# Patient Record
Sex: FEMALE | Race: WHITE
[De-identification: names, ages, dates, MRNs, and addresses within clinical notes are randomized per-mention and may not be internally consistent; named-entity substitution may affect disease eponyms.]

---

## 2018-07-21 ENCOUNTER — HOSPITAL ENCOUNTER (EMERGENCY)
Dept: HOSPITAL 92 - ERS | Age: 53
Discharge: ANOTHER HOSPITAL | End: 2018-07-21
Payer: COMMERCIAL

## 2018-07-21 DIAGNOSIS — I47.1: ICD-10-CM

## 2018-07-21 DIAGNOSIS — Z79.899: ICD-10-CM

## 2018-07-21 DIAGNOSIS — I67.1: Primary | ICD-10-CM

## 2018-07-21 DIAGNOSIS — G45.9: ICD-10-CM

## 2018-07-21 LAB
ALBUMIN SERPL BCG-MCNC: 4.2 G/DL (ref 3.5–5)
ALP SERPL-CCNC: 112 U/L (ref 40–150)
ALT SERPL W P-5'-P-CCNC: 20 U/L (ref 8–55)
ANION GAP SERPL CALC-SCNC: 17 MMOL/L (ref 10–20)
AST SERPL-CCNC: 17 U/L (ref 5–34)
BASOPHILS # BLD AUTO: 0 THOU/UL (ref 0–0.2)
BASOPHILS NFR BLD AUTO: 0.6 % (ref 0–1)
BILIRUB SERPL-MCNC: 0.7 MG/DL (ref 0.2–1.2)
BUN SERPL-MCNC: 13 MG/DL (ref 9.8–20.1)
CALCIUM SERPL-MCNC: 9 MG/DL (ref 7.8–10.44)
CHLORIDE SERPL-SCNC: 110 MMOL/L (ref 98–107)
CK MB SERPL-MCNC: 1.4 NG/ML (ref 0–6.6)
CK SERPL-CCNC: 186 U/L (ref 29–168)
CO2 SERPL-SCNC: 17 MMOL/L (ref 22–29)
CREAT CL PREDICTED SERPL C-G-VRATE: 0 ML/MIN (ref 70–130)
EOSINOPHIL # BLD AUTO: 0.1 THOU/UL (ref 0–0.7)
EOSINOPHIL NFR BLD AUTO: 1.5 % (ref 0–10)
GLOBULIN SER CALC-MCNC: 2.4 G/DL (ref 2.4–3.5)
GLUCOSE SERPL-MCNC: 123 MG/DL (ref 70–105)
HGB BLD-MCNC: 14.5 G/DL (ref 12–16)
LIPASE SERPL-CCNC: 37 U/L (ref 8–78)
LYMPHOCYTES # BLD: 1.3 THOU/UL (ref 1.2–3.4)
LYMPHOCYTES NFR BLD AUTO: 21.5 % (ref 21–51)
MCH RBC QN AUTO: 32.7 PG (ref 27–31)
MCV RBC AUTO: 92.1 FL (ref 78–98)
MONOCYTES # BLD AUTO: 0.3 THOU/UL (ref 0.11–0.59)
MONOCYTES NFR BLD AUTO: 4.3 % (ref 0–10)
NEUTROPHILS # BLD AUTO: 4.4 THOU/UL (ref 1.4–6.5)
NEUTROPHILS NFR BLD AUTO: 72 % (ref 42–75)
PLATELET # BLD AUTO: 187 THOU/UL (ref 130–400)
POTASSIUM SERPL-SCNC: 4.3 MMOL/L (ref 3.5–5.1)
RBC # BLD AUTO: 4.44 MILL/UL (ref 4.2–5.4)
SODIUM SERPL-SCNC: 140 MMOL/L (ref 136–145)
SP GR UR STRIP: 1.04 (ref 1–1.04)
TROPONIN I SERPL DL<=0.01 NG/ML-MCNC: (no result) NG/ML (ref ?–0.03)
WBC # BLD AUTO: 6.1 THOU/UL (ref 4.8–10.8)

## 2018-07-21 PROCEDURE — 84146 ASSAY OF PROLACTIN: CPT

## 2018-07-21 PROCEDURE — 82553 CREATINE MB FRACTION: CPT

## 2018-07-21 PROCEDURE — 96361 HYDRATE IV INFUSION ADD-ON: CPT

## 2018-07-21 PROCEDURE — 85025 COMPLETE CBC W/AUTO DIFF WBC: CPT

## 2018-07-21 PROCEDURE — 70496 CT ANGIOGRAPHY HEAD: CPT

## 2018-07-21 PROCEDURE — 96375 TX/PRO/DX INJ NEW DRUG ADDON: CPT

## 2018-07-21 PROCEDURE — 70450 CT HEAD/BRAIN W/O DYE: CPT

## 2018-07-21 PROCEDURE — 83605 ASSAY OF LACTIC ACID: CPT

## 2018-07-21 PROCEDURE — 82550 ASSAY OF CK (CPK): CPT

## 2018-07-21 PROCEDURE — 83880 ASSAY OF NATRIURETIC PEPTIDE: CPT

## 2018-07-21 PROCEDURE — 93005 ELECTROCARDIOGRAM TRACING: CPT

## 2018-07-21 PROCEDURE — 81003 URINALYSIS AUTO W/O SCOPE: CPT

## 2018-07-21 PROCEDURE — 36416 COLLJ CAPILLARY BLOOD SPEC: CPT

## 2018-07-21 PROCEDURE — 71045 X-RAY EXAM CHEST 1 VIEW: CPT

## 2018-07-21 PROCEDURE — 80053 COMPREHEN METABOLIC PANEL: CPT

## 2018-07-21 PROCEDURE — 83690 ASSAY OF LIPASE: CPT

## 2018-07-21 PROCEDURE — 84484 ASSAY OF TROPONIN QUANT: CPT

## 2018-07-21 PROCEDURE — 70498 CT ANGIOGRAPHY NECK: CPT

## 2018-07-21 PROCEDURE — 96365 THER/PROPH/DIAG IV INF INIT: CPT

## 2018-07-21 PROCEDURE — 36415 COLL VENOUS BLD VENIPUNCTURE: CPT

## 2018-07-21 NOTE — CT
NONCONTRAST CT OF THE BRAIN:

 

INDICATION: 

Stroke activation.  Last seen normal at 0900, now with vomiting, aphasia, and history of aneurysmal c
lipping.

 

COMPARISON: 

None.

 

FINDINGS: 

There is a large left MCA distribution infarct.  There is a left frontotemporal craniotomy.  There ar
e aneurysmal clips seen within the left sylvian fissure.  There is ex vacuo dilatation of the left la
teral ventricle.  No acute infarct, hemorrhage, or hydrocephalus is present.

 

IMPRESSION: 

1.  No acute abnormality.

 

2.  Large left middle cerebral artery distribution infarct.

 

3.  Let frontotemporal craniotomy with multiple aneurysmal clips seen within the left sylvian fissure
.

 

Findings were called to Dr. Sanchez at 10:54 a.m. on 7/21/18.

 

 

CODE CR

 

POS: [facility]

## 2018-07-21 NOTE — RAD
FRONTAL VIEW CHEST:

 

INDICATION: 

Altered mental status, stroke.

 

FINDINGS: 

There is no evidence of consolidation, effusion, or pneumothorax.  The cardiac silhouette is accentua
gus by portable technique.  No free air beneath the hemidiaphragms.

 

IMPRESSION: 

No focal consolidation.

 

POS: [facility]

## 2018-07-21 NOTE — CT
CTA OF THE HEAD AND NECK:

 

INDICATION: 

History of aneurysmal clipping now with aphasia, vomiting, and left-sided weakness.

 

TECHNIQUE: 

Multiple CTA images were obtained of the head and neck utilizing IV contrast and 3D reformatted imagi
ng.  Axial, coronal, and sagittal reformatted images were constructed from the raw data.  Comparisons
 are mad with a CT of the brain without contrast dated 7/21/18.

 

FINDINGS: 

No hemodynamically significant stenosis, occlusion, or aneurysmal formation is seen involving the gre
at vessels of the neck.  

 

There are multiple aneurysmal clips seen within the left sylvian fissure.  There is long-segment narr
owing of the left M1 branch, likely postprocedural in nature.  There is a large area of encephalomala
tobias involving the left MCA distribution, particularly involving the left temporal lobe, left frontal 
lobe, and portions of the left parietal lobe.  

 

There is a 2.5 mm superior projecting aneurysm off of the right MCA bifurcation on image 37 of the co
dudley CTA of the head series and image 204 of the axial CTA head series.  No large-vessel thrombus is
 seen involving the right M1 branch.  

 

The visualized basilar and intracranial vertebral arteries appear widely patent.  

 

Visualized soft tissues of the neck appear within normal limits.  No lymphadenopathy is evident.  No 
acute osseous abnormality is evident.  Visualized lung apices are clear.  

 

IMPRESSION: 

1.  No large-vessel thrombus demonstrated.

 

2.  Long segment of narrowing involving the left M1 branch, likely postprocedural in nature related t
o patient's aneurysmal clipping on the left side.  There is a large left MCA distribution infarct.

 

3.  A small 2.5 mm aneurysm seen involving the right middle cerebral artery bifurcation.  Neurosurgic
al followup and a followup CTA examination in 12 months is recommended.

 

4.  Findings are called to Dr. Sanchez at 11:17 a.m. on 7/21/18.

 

 

CODE CR

 

POS: [facility]